# Patient Record
(demographics unavailable — no encounter records)

---

## 2024-10-10 NOTE — REASON FOR VISIT
[CV Risk Factors and Non-Cardiac Disease] : CV risk factors and non-cardiac disease [FreeTextEntry3] : Dr. Del Real

## 2024-10-10 NOTE — HISTORY OF PRESENT ILLNESS
[Home] : at home, [unfilled] , at the time of the visit. [Medical Office: ([facility])___] : at the medical office located in  [Verbal consent obtained from patient] : the patient, [unfilled] [FreeTextEntry1] : 60 y/o female with h/o obesity, hl, predm who presents today as a referral from Dr. Del Real for weight management and lifestyle counseling.  In May, we started pt on metformin based on supply chain issues w/ GLP1 at this time; advised we can always consider Wegovy or Zepbound in the future as needed/indicated. Saw her for follow up in August and increased dosage to 1,000mg daily. She was losing some weight; about 6 lbs at that time.   Since then, pt continues to report feeling no side effects from the metformin. She reports she could be better w/ lifestyle and work has been very stressful. Would like to lose more weight ideally.   Patient denies any chest pain, SOB, palpitations, orthopnea, PND or LE edema. ==================================================================================  Went into menopause at age 48; before that never had an issue w/ her weight.   After pregnancy she did WW and the "clean diet" and was successful losing weight. She eats overall well she thinks; likes carbs, but doesn't eat red meat. Eats chicken and seafood. Admits she could eat more veggies; eats fruit. Nondrinker and nonsmoker.   Now she is frustrated by weight gain and inability to lose.   Doing IF now. Not noticing any weight loss from it however.  She has a sedentary job; walks and sometimes plays tennis or pickleball. Tries to hit 10K steps per day.    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

## 2024-10-10 NOTE — DISCUSSION/SUMMARY
[FreeTextEntry1] : 58 y/o female with h/o obesity, hl, predm who presents today as a referral from Dr. Del Real for weight management and lifestyle counseling.  Dietary and exercise habits reviewed and discussed with over 50% of the visit spent discussing cardiovascular disease, the optimization of risk factors and lifestyle strategies that can be used to achieve this.   Obesity; BMI 30; preDM: A1c 5.7% from labs 4/24 - c/w metformin 1,000mg daily  - will put in order for A1c and BMP to be done at her appt w/ Dr. Del Real in a few weeks on 10/21 - will c/w metformin as above for now; can always consider Wegovy or Zepbound in the future (pt would be willing to pay OOP w/ coupon code if we go that route) - Encouraged patient to continue healthy exercise and eating habits, focusing on a Mediterranean style of eating w/ more plant based foods in general, and aiming for the recommended 150 minutes per week of moderate physical activity. Especially encouraged she start strength training.   Pt will continue to follow w/ Dr. Del Real as well. Will f/u after labs and decide on continued treatment plan.

## 2024-10-21 NOTE — DISCUSSION/SUMMARY
[Patient] : the patient [___ Week(s)] : in [unfilled] week(s) [FreeTextEntry1] : 58 y/o female with h/o cad, obesity, hl, predm, htn who presents for f/up today  -dc crestor, trial of lipitor 10 mg  -add aspirin -CTA cor 8/24: Cardiac: 1.  The calcium score is 0 Agatston units 2.  Minimal stenosis in mid LAD. 3.  Remaining segments appear angiographically normal.  Non-cardiac: 1. No significant findings.  -Echo 6/24: 1. Left ventricular cavity is normal in size. Left ventricular wall thickness is normal. Left ventricular systolic function is normal with an ejection fraction of 65 %. There are no regional wall motion abnormalities seen. 2. Normal left ventricular diastolic function. 3. Normal right ventricular cavity size, with normal wall thickness, and normal systolic function. 4. No significant valvular disease. 5. No pericardial effusion seen.   -f/up with Leah for weight loss drugs - on metformin, advised her to discuss injectables   -ekg 5/24 - nsr, normal intervals, nsra -labs 2024 reviewed, ordered bmp, A1c, lipids, LpA -increase to norvasc 5 mg qd  -refer for sharmaine evaluation -consider stella duplex -counseled on cvd risk factors -f/up 2-3 weeks for bp  I have spent 30 minutes reviewing labs, records, tests and discussed cvd risk factors, cad, htn.

## 2024-10-21 NOTE — HISTORY OF PRESENT ILLNESS
[FreeTextEntry1] : 58 y/o female with h/o cad, obesity, hl, predm, htn who presents for f/up today  last seen  started on norvasc, crestor, metformin notes myalgias on crestor has not started asa  -CTA cor : Cardiac: 1.  The calcium score is 0 Agatston units 2.  Minimal stenosis in mid LAD. 3.  Remaining segments appear angiographically normal.  Non-cardiac: 1. No significant findings.  -Echo : 1. Left ventricular cavity is normal in size. Left ventricular wall thickness is normal. Left ventricular systolic function is normal with an ejection fraction of 65 %. There are no regional wall motion abnormalities seen. 2. Normal left ventricular diastolic function. 3. Normal right ventricular cavity size, with normal wall thickness, and normal systolic function. 4. No significant valvular disease. 5. No pericardial effusion seen.  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

## 2025-01-21 NOTE — DISCUSSION/SUMMARY
[Patient] : the patient [___ Month(s)] : in [unfilled] month(s) [FreeTextEntry1] : 59 y/o female with h/o cad, obesity, hl, predm, htn who presents for f/up today  -continue lipitor -add aspirin -CTA cor 8/24: Cardiac: 1. The calcium score is 0 Agatston units 2. Minimal stenosis in mid LAD. 3. Remaining segments appear angiographically normal.  Non-cardiac: 1. No significant findings.  -Echo 6/24: 1. Left ventricular cavity is normal in size. Left ventricular wall thickness is normal. Left ventricular systolic function is normal with an ejection fraction of 65 %. There are no regional wall motion abnormalities seen. 2. Normal left ventricular diastolic function. 3. Normal right ventricular cavity size, with normal wall thickness, and normal systolic function. 4. No significant valvular disease. 5. No pericardial effusion seen.  -f/up with Alejandro for weight loss drugs - on metformin, advised her to discuss injectables  -ekg ordered today - nsr, normal intervals, nsra -labs 2024 reviewed, ordered lipids today -continue norvasc 5 mg qd -refer for sharmaine evaluation -ordered stella duplex -counseled on cvd risk factors -f/up 6 months for htn, cvd risk factors  I have spent 30 minutes reviewing labs, records, tests and discussed cvd risk factors, cad, htn. [EKG obtained to assist in diagnosis and management of assessed problem(s)] : EKG obtained to assist in diagnosis and management of assessed problem(s)

## 2025-01-21 NOTE — HISTORY OF PRESENT ILLNESS
[FreeTextEntry1] : 61 y/o female with h/o cad, obesity, hl, predm, htn who presents for f/up today  last seen 10/24 norvasc increased, lipitor started  noted myalgias on crestor has not started asa  -CTA cor : Cardiac: 1. The calcium score is 0 Agatston units 2. Minimal stenosis in mid LAD. 3. Remaining segments appear angiographically normal.  Non-cardiac: 1. No significant findings.  -Echo : 1. Left ventricular cavity is normal in size. Left ventricular wall thickness is normal. Left ventricular systolic function is normal with an ejection fraction of 65 %. There are no regional wall motion abnormalities seen. 2. Normal left ventricular diastolic function. 3. Normal right ventricular cavity size, with normal wall thickness, and normal systolic function. 4. No significant valvular disease. 5. No pericardial effusion seen.  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

## 2025-01-24 NOTE — DISCUSSION/SUMMARY
[FreeTextEntry1] : 61 y/o female with h/o obesity, hl, predm who presents today as a referral from Dr. Del Real for weight management and lifestyle counseling.  -Dietary and exercise habits reviewed and discussed with over 50% of the visit spent discussing cardiovascular disease, the optimization of risk factors and lifestyle strategies that can be used to achieve this. -The cardiometabolic benefits of GLP-1 agonists were discussed as well as the possible side effects, including injection site reaction, nausea, vomiting, diarrhea, constipation, gastroparesis, dehydration that could worsen kidney function, mood changes, worsening diabetic retinopathy, dizziness and headaches. Pancreatitis and kidney failure history were reviewed since caution should be taken in patients with a history of either condition. -Encouraged adequate hydration, especially in the setting of reduced food volume while taking the drug. -Patient denies a personal or family history of medullary thyroid cancer or MEN2. -Advised that the risk of hypoglycemia with the drug class is rare, but does increase if combined with insulin or other oral diabetes medications and increased risk of side effects can be noted as well. -Advised pt that strength training will be a necessity while on these medications, as weight loss medicines and drastic/fast weight loss can contribute to lean/muscle mass decrease and be a risk for frailty/fractures. Advised no more than 1-2 lbs weight loss per week and that exercise/strength training will encourage fat loss while sparing lean muscle mass. Additionally, most studies on the medications included people under 60 years of age, so there is some uncertainty about long-term effects for older individual - Current weight 166 lbs; BMI 30.36 kg/m2 and has h/o CAD, HTN, HLD, prediabetes - will send Rx for Zepbound 2.5 mg which patient will pay for OOP with coupon code - Encouraged patient to continue healthy exercise and eating habits, focusing on a Mediterranean style of eating w/ more plant based foods in general, and aiming for the recommended 150 minutes per week of moderate physical activity.    Follow up 3-4 months

## 2025-01-24 NOTE — HISTORY OF PRESENT ILLNESS
[Home] : at home, [unfilled] , at the time of the visit. [Medical Office: ([facility])___] : at the medical office located in  [Verbal consent obtained from patient] : the patient, [unfilled] [FreeTextEntry1] : 61 y/o female with h/o obesity, hl, predm who presents today as a referral from Dr. Del Real for weight management and lifestyle counseling.   Last seen by Leah Vasquez NP on 10/10/2024. Previously was on Metformin 500 BID but stopped in October due to plans to starting GLP-1 agonist therapy. She plans to start Zepbound OOP with a coupon code.  She had minimal weight loss on Metformin and continues to feel frustrated by her inability to lose weight. Her most recent weight is 166 lbs, BMI 30.36 kg/m2. She tries to follow a healthy diet and is working ot increase her intake of vegetables. For exercise she walks a lot daily and does spinning classes a few times a week.   Cardiologist: Dr. Simran Del Real PCP: Dr. Duncan Calderón Last BMP:10/21/2024 ================================================================================== Prior history: In May, we started pt on metformin based on supply chain issues w/ GLP1 at this time; advised we can always consider Wegovy or Zepbound in the future as needed/indicated. Saw her for follow up in August and increased dosage to 1,000mg daily. She was losing some weight; about 6 lbs at that time.   Since then, pt continues to report feeling no side effects from the metformin. She reports she could be better w/ lifestyle and work has been very stressful. Would like to lose more weight ideally.   Patient denies any chest pain, SOB, palpitations, orthopnea, PND or LE edema. ==================================================================================  Went into menopause at age 48; before that never had an issue w/ her weight.   After pregnancy she did WW and the "clean diet" and was successful losing weight. She eats overall well she thinks; likes carbs, but doesn't eat red meat. Eats chicken and seafood. Admits she could eat more veggies; eats fruit. Nondrinker and nonsmoker.   Now she is frustrated by weight gain and inability to lose.   Doing IF now. Not noticing any weight loss from it however.  She has a sedentary job; walks and sometimes plays tennis or pickleball. Tries to hit 10K steps per day.    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

## 2025-01-24 NOTE — DISCUSSION/SUMMARY
[FreeTextEntry1] : 59 y/o female with h/o obesity, hl, predm who presents today as a referral from Dr. Del Real for weight management and lifestyle counseling.  -Dietary and exercise habits reviewed and discussed with over 50% of the visit spent discussing cardiovascular disease, the optimization of risk factors and lifestyle strategies that can be used to achieve this. -The cardiometabolic benefits of GLP-1 agonists were discussed as well as the possible side effects, including injection site reaction, nausea, vomiting, diarrhea, constipation, gastroparesis, dehydration that could worsen kidney function, mood changes, worsening diabetic retinopathy, dizziness and headaches. Pancreatitis and kidney failure history were reviewed since caution should be taken in patients with a history of either condition. -Encouraged adequate hydration, especially in the setting of reduced food volume while taking the drug. -Patient denies a personal or family history of medullary thyroid cancer or MEN2. -Advised that the risk of hypoglycemia with the drug class is rare, but does increase if combined with insulin or other oral diabetes medications and increased risk of side effects can be noted as well. -Advised pt that strength training will be a necessity while on these medications, as weight loss medicines and drastic/fast weight loss can contribute to lean/muscle mass decrease and be a risk for frailty/fractures. Advised no more than 1-2 lbs weight loss per week and that exercise/strength training will encourage fat loss while sparing lean muscle mass. Additionally, most studies on the medications included people under 60 years of age, so there is some uncertainty about long-term effects for older individual - Current weight 166 lbs; BMI 30.36 kg/m2 and has h/o CAD, HTN, HLD, prediabetes - will send Rx for Zepbound 2.5 mg which patient will pay for OOP with coupon code - Encouraged patient to continue healthy exercise and eating habits, focusing on a Mediterranean style of eating w/ more plant based foods in general, and aiming for the recommended 150 minutes per week of moderate physical activity.    Follow up 3-4 months

## 2025-01-24 NOTE — HISTORY OF PRESENT ILLNESS
[Home] : at home, [unfilled] , at the time of the visit. [Medical Office: ([facility])___] : at the medical office located in  [Verbal consent obtained from patient] : the patient, [unfilled] [FreeTextEntry1] : 59 y/o female with h/o obesity, hl, predm who presents today as a referral from Dr. Del Real for weight management and lifestyle counseling.   Last seen by Leah Vasquez NP on 10/10/2024. Previously was on Metformin 500 BID but stopped in October due to plans to starting GLP-1 agonist therapy. She plans to start Zepbound OOP with a coupon code.  She had minimal weight loss on Metformin and continues to feel frustrated by her inability to lose weight. Her most recent weight is 166 lbs, BMI 30.36 kg/m2. She tries to follow a healthy diet and is working ot increase her intake of vegetables. For exercise she walks a lot daily and does spinning classes a few times a week.   Cardiologist: Dr. Simran Del Real PCP: Dr. Duncan Calderón Last BMP:10/21/2024 ================================================================================== Prior history: In May, we started pt on metformin based on supply chain issues w/ GLP1 at this time; advised we can always consider Wegovy or Zepbound in the future as needed/indicated. Saw her for follow up in August and increased dosage to 1,000mg daily. She was losing some weight; about 6 lbs at that time.   Since then, pt continues to report feeling no side effects from the metformin. She reports she could be better w/ lifestyle and work has been very stressful. Would like to lose more weight ideally.   Patient denies any chest pain, SOB, palpitations, orthopnea, PND or LE edema. ==================================================================================  Went into menopause at age 48; before that never had an issue w/ her weight.   After pregnancy she did WW and the "clean diet" and was successful losing weight. She eats overall well she thinks; likes carbs, but doesn't eat red meat. Eats chicken and seafood. Admits she could eat more veggies; eats fruit. Nondrinker and nonsmoker.   Now she is frustrated by weight gain and inability to lose.   Doing IF now. Not noticing any weight loss from it however.  She has a sedentary job; walks and sometimes plays tennis or pickleball. Tries to hit 10K steps per day.    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

## 2025-05-16 NOTE — HISTORY OF PRESENT ILLNESS
[Home] : at home, [unfilled] , at the time of the visit. [Medical Office: ([facility])___] : at the medical office located in  [Verbal consent obtained from patient] : the patient, [unfilled] [FreeTextEntry1] : 59 y/o female with h/o obesity, hl, predm who presents today as a referral from Dr. Del Real for weight management and lifestyle counseling.   Last seen by me on 2025, planned to start Zepbound OOP received PA request for Wegovy?  Cardiologist: Dr. Simran Del Real PCP: Dr. Duncan Calderón Last BMP:10/21/2024 ================================================================= Previous history: Last seen by Leah Vasquez NP on 10/10/2024. Previously was on Metformin 500 BID but stopped in October due to plans to starting GLP-1 agonist therapy. She plans to start Zepbound OOP with a coupon code.  She had minimal weight loss on Metformin and continues to feel frustrated by her inability to lose weight. Her most recent weight is 166 lbs, BMI 30.36 kg/m2. She tries to follow a healthy diet and is working ot increase her intake of vegetables. For exercise she walks a lot daily and does spinning classes a few times a week. ================================================================================== Prior history: In May, we started pt on metformin based on supply chain issues w/ GLP1 at this time; advised we can always consider Wegovy or Zepbound in the future as needed/indicated. Saw her for follow up in August and increased dosage to 1,000mg daily. She was losing some weight; about 6 lbs at that time.   Since then, pt continues to report feeling no side effects from the metformin. She reports she could be better w/ lifestyle and work has been very stressful. Would like to lose more weight ideally.   Patient denies any chest pain, SOB, palpitations, orthopnea, PND or LE edema. ==================================================================================  Went into menopause at age 48; before that never had an issue w/ her weight.   After pregnancy she did WW and the "clean diet" and was successful losing weight. She eats overall well she thinks; likes carbs, but doesn't eat red meat. Eats chicken and seafood. Admits she could eat more veggies; eats fruit. Nondrinker and nonsmoker.   Now she is frustrated by weight gain and inability to lose.   Doing IF now. Not noticing any weight loss from it however.  She has a sedentary job; walks and sometimes plays tennis or pickleball. Tries to hit 10K steps per day.    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

## 2025-05-16 NOTE — HISTORY OF PRESENT ILLNESS
[Home] : at home, [unfilled] , at the time of the visit. [Medical Office: ([facility])___] : at the medical office located in  [Verbal consent obtained from patient] : the patient, [unfilled] [FreeTextEntry1] : 61 y/o female with h/o obesity, hl, predm who presents today as a referral from Dr. Del Real for weight management and lifestyle counseling.   Last seen by me on 2025, planned to start Zepbound OOP received PA request for Wegovy?  Cardiologist: Dr. Simran Del Real PCP: Dr. Duncan Calderón Last BMP:10/21/2024 ================================================================= Previous history: Last seen by Leah Vasquez NP on 10/10/2024. Previously was on Metformin 500 BID but stopped in October due to plans to starting GLP-1 agonist therapy. She plans to start Zepbound OOP with a coupon code.  She had minimal weight loss on Metformin and continues to feel frustrated by her inability to lose weight. Her most recent weight is 166 lbs, BMI 30.36 kg/m2. She tries to follow a healthy diet and is working ot increase her intake of vegetables. For exercise she walks a lot daily and does spinning classes a few times a week. ================================================================================== Prior history: In May, we started pt on metformin based on supply chain issues w/ GLP1 at this time; advised we can always consider Wegovy or Zepbound in the future as needed/indicated. Saw her for follow up in August and increased dosage to 1,000mg daily. She was losing some weight; about 6 lbs at that time.   Since then, pt continues to report feeling no side effects from the metformin. She reports she could be better w/ lifestyle and work has been very stressful. Would like to lose more weight ideally.   Patient denies any chest pain, SOB, palpitations, orthopnea, PND or LE edema. ==================================================================================  Went into menopause at age 48; before that never had an issue w/ her weight.   After pregnancy she did WW and the "clean diet" and was successful losing weight. She eats overall well she thinks; likes carbs, but doesn't eat red meat. Eats chicken and seafood. Admits she could eat more veggies; eats fruit. Nondrinker and nonsmoker.   Now she is frustrated by weight gain and inability to lose.   Doing IF now. Not noticing any weight loss from it however.  She has a sedentary job; walks and sometimes plays tennis or pickleball. Tries to hit 10K steps per day.    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

## 2025-05-29 NOTE — DISCUSSION/SUMMARY
[FreeTextEntry1] : Right trigger thumb.  Patient will allow the injection to work for her over the next 2 weeks.  70% chance that this could cure the problem.  Understands 30% chance it can recur.  If it recurs another injection would be indicated.  Eventually she might need surgery to release the A1 pulley.  I have told her if she gets increasing symptoms of carpal tunnel syndrome she should come back for reevaluation.  Otherwise I will see her as needed.

## 2025-05-29 NOTE — HISTORY OF PRESENT ILLNESS
[FreeTextEntry1] : 60-year-old female right-hand-dominant comes in for evaluation of pain discomfort in her right thumb this been going on for about 3 weeks.  She has pain discomfort in the volar surface of the thumb at the MCP joint.  She has difficulty extending it at the IP joint.  There is clicking.  There is been no injury or excessive activity that she remembers.  She has never had this problem before.  No other fingers are involved.  Her left hand is fine.  No numbness or tingling associated with it but she does complain of carpal tunnel syndrome symptoms especially when using the phone driving and waking up in the morning.  She has never had that taken care of.

## 2025-05-29 NOTE — PHYSICAL EXAM
[de-identified] : On exam patient's right thumb is swollen around the MCP joint.  It is tender to palpation at the A1 pulley and volar aspect of the MCP joint at the flexion crease.  She can extend the thumb past 20 degrees and if she does there is clicking and locking of the flexor tendon.  She has no pain at the basal joint.  Negative grind negative forced adduction test.  No pain in the snuffbox or radial styloid.  Negative Finkelstein test negative Bills test.  No pain in the other fingers.  No significant Tinel's or Phalen's test in the right hand.  Good thenar strength and interosseous strength.  Her radial median ulnar nerves are intact.  Good pulses refill sensation.  Left hand exam the same fashion as the right and has no significant abnormalities.  There is no atrophy.  No swelling.  No evidence of flexor or extensor tenosynovitis.  Her radial median ulnar nerves are intact.  Good pulses refill sensation. [de-identified] : X-ray of the right thumb shows some mild osteoarthritic changes in the first carpometacarpal joint and first metacarpal phalangeal joint.  No other abnormality seen.

## 2025-07-21 NOTE — HISTORY OF PRESENT ILLNESS
[FreeTextEntry1] : 61 y/o female with h/o cad, obesity, hl, predm, htn who presents for f/up today  last seen    started zepbound this week  MILAGROS ordered - not done has not had sharmaine evaluation   noted myalgias on crestor   -CTA cor : Cardiac: 1. The calcium score is 0 Agatston units 2. Minimal stenosis in mid LAD. 3. Remaining segments appear angiographically normal.  Non-cardiac: 1. No significant findings.  -Echo : 1. Left ventricular cavity is normal in size. Left ventricular wall thickness is normal. Left ventricular systolic function is normal with an ejection fraction of 65 %. There are no regional wall motion abnormalities seen. 2. Normal left ventricular diastolic function. 3. Normal right ventricular cavity size, with normal wall thickness, and normal systolic function. 4. No significant valvular disease. 5. No pericardial effusion seen.  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

## 2025-07-21 NOTE — DISCUSSION/SUMMARY
[Patient] : the patient [EKG obtained to assist in diagnosis and management of assessed problem(s)] : EKG obtained to assist in diagnosis and management of assessed problem(s) [___ Month(s)] : in [unfilled] month(s) [FreeTextEntry1] : 59 y/o female with h/o cad, obesity, hl, predm, htn who presents for f/up today  -continue asa, lipitor   -CTA cor 8/24: Cardiac: 1. The calcium score is 0 Agatston units 2. Minimal stenosis in mid LAD. 3. Remaining segments appear angiographically normal.  Non-cardiac: 1. No significant findings.  -Echo 6/24: 1. Left ventricular cavity is normal in size. Left ventricular wall thickness is normal. Left ventricular systolic function is normal with an ejection fraction of 65 %. There are no regional wall motion abnormalities seen. 2. Normal left ventricular diastolic function. 3. Normal right ventricular cavity size, with normal wall thickness, and normal systolic function. 4. No significant valvular disease. 5. No pericardial effusion seen.  -f/up with Alejandro for weight loss drugs - on zepbound now -ekg ordered today - nsr, normal intervls, no st/t chnages -labs 2024 reviewed, labs ordered today -LpA wnl -continue norvasc 5 mg qd -refer for sharmaine evaluation -ordered stella duplex -counseled on cvd risk factors -f/up 6 months for htn, cvd risk factors  I have spent 30 minutes reviewing labs, records, tests and discussed cvd risk factors